# Patient Record
Sex: FEMALE | Race: WHITE | Employment: PART TIME | URBAN - METROPOLITAN AREA
[De-identification: names, ages, dates, MRNs, and addresses within clinical notes are randomized per-mention and may not be internally consistent; named-entity substitution may affect disease eponyms.]

---

## 2018-03-30 ENCOUNTER — OFFICE VISIT (OUTPATIENT)
Dept: FAMILY MEDICINE CLINIC | Facility: CLINIC | Age: 28
End: 2018-03-30

## 2018-03-30 VITALS
RESPIRATION RATE: 18 BRPM | DIASTOLIC BLOOD PRESSURE: 80 MMHG | TEMPERATURE: 99 F | WEIGHT: 213 LBS | BODY MASS INDEX: 38 KG/M2 | SYSTOLIC BLOOD PRESSURE: 112 MMHG | HEART RATE: 92 BPM

## 2018-03-30 DIAGNOSIS — G43.109 MIGRAINE WITH AURA AND WITHOUT STATUS MIGRAINOSUS, NOT INTRACTABLE: Primary | ICD-10-CM

## 2018-03-30 DIAGNOSIS — Z30.41 ENCOUNTER FOR BIRTH CONTROL PILLS MAINTENANCE: ICD-10-CM

## 2018-03-30 PROCEDURE — 99213 OFFICE O/P EST LOW 20 MIN: CPT | Performed by: FAMILY MEDICINE

## 2018-03-30 RX ORDER — NAPROXEN 250 MG/1
TABLET ORAL
Qty: 20 TABLET | Refills: 1 | Status: SHIPPED | OUTPATIENT
Start: 2018-03-30 | End: 2018-11-13

## 2018-03-30 RX ORDER — NORGESTIMATE AND ETHINYL ESTRADIOL 7DAYSX3 28
1 KIT ORAL DAILY
Qty: 28 TABLET | Refills: 11 | Status: SHIPPED | OUTPATIENT
Start: 2018-03-30 | End: 2018-04-27

## 2018-03-30 RX ORDER — NORGESTIMATE-ETHINYL ESTRADIOL 7DAYSX3 28
TABLET ORAL
COMMUNITY
Start: 2018-03-03 | End: 2018-05-04

## 2018-03-30 NOTE — PATIENT INSTRUCTIONS
-Condoms are recommended in all  and unmarried couples due to help spread HIV infection and other sexually transmitted infections per CDC guidelines. · Oral birth control pills can cause blood clots in the body.   · Signs of blood clots in the legs Keeping a headache diary can help you and your healthcare provider identify what's causing your headaches:  · Note when each headache happens. · Identify your activities and the foods you've eaten 6 to 8 hours before the headache began.   · Look for any tr The first step in preventing migraines is to learn what triggers them. You may then be able to control your triggers to avoid or reduce the severity of your migraines.   Know your triggers  Be aware that you may have more than one trigger, and that some tri © 0419-7312 [employer] [address]. 1407 [facility], [facility] [place] [place]. All rights reserved. This information is not intended as a substitute for professional medical care. Always follow your healthcare professional's instructions.

## 2018-03-30 NOTE — PROGRESS NOTES
CHIEF COMPLAINT: Patient presents with:  Headache: twice weekly    HPI:     Ryan Vega is a 33year old female presents for reestablish care and needs refill of OCPs.   Started on TriNessa 4 months ago again and has had no headaches up until the be reviewed from today and agreed except as otherwise stated in HPI.  ROS:     Review of Systems  Positive for stated complaint: Recent headache, desires contraception     Pertinent positives and negatives noted in the the HPI.     PHYSICAL EXAM:   /80 ( intractable  Tension headaches off and I believe mixed headaches  Will avoid caffeine and discussed diary to determine triggers, sleep and stress are common  Do not believe birth control is causing migraines since 4 months without any headaches.   If headac albumin     History of anemia     Low vitamin D level     Chronic right-sided headaches     Paresthesia of both feet     Migraine with aura and without status migrainosus, not intractable     Encounter for birth control pills maintenance      Imaging & Ref

## 2018-05-04 ENCOUNTER — OFFICE VISIT (OUTPATIENT)
Dept: FAMILY MEDICINE CLINIC | Facility: CLINIC | Age: 28
End: 2018-05-04

## 2018-05-04 VITALS
HEART RATE: 83 BPM | TEMPERATURE: 98 F | OXYGEN SATURATION: 98 % | WEIGHT: 211 LBS | DIASTOLIC BLOOD PRESSURE: 80 MMHG | SYSTOLIC BLOOD PRESSURE: 110 MMHG | HEIGHT: 62.5 IN | RESPIRATION RATE: 18 BRPM | BODY MASS INDEX: 37.86 KG/M2

## 2018-05-04 DIAGNOSIS — Z13.21 SCREENING FOR ENDOCRINE, NUTRITIONAL, METABOLIC AND IMMUNITY DISORDER: ICD-10-CM

## 2018-05-04 DIAGNOSIS — Z23 NEED FOR VACCINATION: ICD-10-CM

## 2018-05-04 DIAGNOSIS — Z13.228 SCREENING FOR ENDOCRINE, NUTRITIONAL, METABOLIC AND IMMUNITY DISORDER: ICD-10-CM

## 2018-05-04 DIAGNOSIS — Z30.09 COUNSELING FOR BIRTH CONTROL, ORAL CONTRACEPTIVES: ICD-10-CM

## 2018-05-04 DIAGNOSIS — Z00.00 ANNUAL PHYSICAL EXAM: Primary | ICD-10-CM

## 2018-05-04 DIAGNOSIS — Z13.6 SCREENING FOR HEART DISEASE: ICD-10-CM

## 2018-05-04 DIAGNOSIS — Z13.0 SCREENING FOR IRON DEFICIENCY ANEMIA: ICD-10-CM

## 2018-05-04 DIAGNOSIS — Z13.29 SCREENING FOR ENDOCRINE, NUTRITIONAL, METABOLIC AND IMMUNITY DISORDER: ICD-10-CM

## 2018-05-04 DIAGNOSIS — Z13.0 SCREENING FOR ENDOCRINE, NUTRITIONAL, METABOLIC AND IMMUNITY DISORDER: ICD-10-CM

## 2018-05-04 PROCEDURE — 84443 ASSAY THYROID STIM HORMONE: CPT | Performed by: FAMILY MEDICINE

## 2018-05-04 PROCEDURE — 85025 COMPLETE CBC W/AUTO DIFF WBC: CPT | Performed by: FAMILY MEDICINE

## 2018-05-04 PROCEDURE — 80053 COMPREHEN METABOLIC PANEL: CPT | Performed by: FAMILY MEDICINE

## 2018-05-04 PROCEDURE — 99395 PREV VISIT EST AGE 18-39: CPT | Performed by: FAMILY MEDICINE

## 2018-05-04 PROCEDURE — 80061 LIPID PANEL: CPT | Performed by: FAMILY MEDICINE

## 2018-05-04 RX ORDER — NORGESTIMATE-ETHINYL ESTRADIOL 7DAYSX3 28
1 TABLET ORAL NIGHTLY
Qty: 28 TABLET | Refills: 11 | Status: SHIPPED | OUTPATIENT
Start: 2018-05-04 | End: 2019-02-26

## 2018-05-04 NOTE — PATIENT INSTRUCTIONS
Perform labs fasting 8 hours with water or black coffee or or black tea diet  soda only prior to exam.    -Encourage healthy diet of whole food and avoid processed food and sugary drinks and sodas.   Diet should include lean meats and vegetables including 5 Vitamin D comes in several forms. When ultraviolet light, such as sunlight, hits your skin, it creates vitamin D3. D2 is used to fortify dairy foods. Both of these are further processed by your liver and kidneys into a form your body can use.  Most tests fo Children and adults need more than 30 nanograms per milliliter (ng/ml) of vitamin D. The optimal level of 25(OH)D is usually between 30 and 60 ng/mL. Recommended daily amounts range from 400 to 800 international units (IU) per day based on your age.   Level Birth control pills contain hormones that help prevent pregnancy. The pills are prescribed by your healthcare provider. There are many types of birth control pills available. If you have side effects from one type of pill, tell your healthcare provider.  He In these cases, discuss the risks with your healthcare provider. Date Last Reviewed: 3/1/2017  © 0086-1658 [employer] [address]. 1407 [facility] [place], [address] [place] [address]. All rights reserved.  This information is not intended as a substitute for prof In later years, both men and women need to take extra care of their bones. By this point, the body loses more bone than it makes. If too much bone is lost, you may be at risk for fractures. With age, the quality and quantity of bone declines.  You can lesse Cheddar cheese   205 mg/1 oz.   Navy beans, cooked   79 mg/1/2 cup   Kale   90 mg/1/2 cup   Ice cream strawberry   79 mg/1/2 cup           Orange, navel   56 mg/1 medium   Note: Calcium levels may vary depending on brand and size.   Daily calcium needs  14-

## 2018-05-04 NOTE — PROGRESS NOTES
Patient came in for fasting labs. Patient drawn out of Right  AC x 1 attempt. Lt green,lav tube drawn.

## 2018-05-04 NOTE — PROGRESS NOTES
REASON FOR VISIT:    Kaykay Jeffrey is a 33year old female who presents for an [address]. On OCP x 5.y. No side effects. No fx or personal hx of blood clots.   BF x 2 years, monogamous  G1 elective Ab  menses: normal 5-7 days  Last p AST 42 (H) 03/20/2015       Lab Results  Component Value Date   ALT 33 09/24/2016   ALT 20 04/19/2016   ALT 78 (H) 03/20/2015       Lab Results  Component Value Date   TSH 1.160 04/19/2016       Lab Results  Component Value Date   BUN 10 09/24/2016   BUN 1 Obesity Screening Screen all adults annually Body mass index is 37.98 kg/m².       Preventive Services for Which Recommendations Vary with Risk Recommendation Internal Lab or Procedure External Lab or Procedure   Cholesterol Screening Recommended screening Dilated Eye exam  Annually No flowsheet data found. No flowsheet data found. Asthma  (Annually between Nov. 1 & Dec. 31)    Date of last AAP/ACT and counseling given on importance of controller meds.                  ALLERGIES:     Dander PSYCHE: denies depression or anxiety  HEMATOLOGIC: denies hx of anemia  ENDOCRINE: denies thyroid history  ALL/ASTHMA: denies hx of allergy or asthma    EXAM:   /80 (BP Location: Left arm, Patient Position: Sitting, Cuff Size: adult)   Pulse 83   Tem -Needs 1000 mg of calcium daily for osteoporosis prevention discussed  -thin prep pap recommended every 3 years due 12/7/18  -mammogram order placed screening    - CBC WITH DIFFERENTIAL WITH PLATELET; Future  - COMP METABOLIC PANEL (14);  Future  - LIPID PA

## 2018-05-08 ENCOUNTER — TELEPHONE (OUTPATIENT)
Dept: FAMILY MEDICINE CLINIC | Facility: CLINIC | Age: 28
End: 2018-05-08

## 2018-05-08 NOTE — TELEPHONE ENCOUNTER
LMOM to return call to the office. Provided pt office phone [phone] along with office hours given.     Notes recorded by Bettina Daniel DO on 5/6/2018 at 12:53 PM CDT  Needs OV  to address cholesterol.  Please inform.  All other labs normal except f

## 2018-05-10 ENCOUNTER — OFFICE VISIT (OUTPATIENT)
Dept: FAMILY MEDICINE CLINIC | Facility: CLINIC | Age: 28
End: 2018-05-10

## 2018-05-10 VITALS
TEMPERATURE: 98 F | SYSTOLIC BLOOD PRESSURE: 120 MMHG | RESPIRATION RATE: 18 BRPM | DIASTOLIC BLOOD PRESSURE: 76 MMHG | OXYGEN SATURATION: 99 % | WEIGHT: 211.38 LBS | BODY MASS INDEX: 38 KG/M2 | HEART RATE: 85 BPM

## 2018-05-10 DIAGNOSIS — Z86.2 HISTORY OF ANEMIA: ICD-10-CM

## 2018-05-10 DIAGNOSIS — E78.2 MIXED HYPERLIPIDEMIA: Primary | ICD-10-CM

## 2018-05-10 DIAGNOSIS — R79.89 LOW VITAMIN D LEVEL: ICD-10-CM

## 2018-05-10 PROCEDURE — 99214 OFFICE O/P EST MOD 30 MIN: CPT | Performed by: FAMILY MEDICINE

## 2018-05-10 NOTE — PROGRESS NOTES
CHIEF COMPLAINT: Patient presents with: Follow - Up: labs    HPI:     Nikia Miller is a 33year old female presents for discuss labs. Finishes school and finals in a couple weeks and states she is going to completely change her eating.   Eating a l HPI.  ROS:     Review of Systems  Positive for stated complaint: Some fatigue, denies headache, chest pain     Pertinent positives and negatives noted in the the HPI.     PHYSICAL EXAM:   /76 (BP Location: Right arm, Patient Position: Sitting, Cuff Si 05/04/2018 13.1    • HCT 05/04/2018 40.8    • PLT 05/04/2018 261.0    • MCV 05/04/2018 83.4    • MCH 05/04/2018 26.8*   • MCHC 05/04/2018 32.1    • RDW 05/04/2018 12.9    • RDW-SD 05/04/2018 39.6    • Neutrophil Absolute Prel* 05/04/2018 3.93    • Neutroph months, daily then 1 tablet indefinitely    3.  History of anemia  Hemoglobin is 13.1  If decreases red quantified meat to 1 time weekly, needs to eat chicken and iron rich vegetables such as spinach several servings weekly and at least one leafy green ace discuss labs, HL,.

## 2018-05-10 NOTE — PATIENT INSTRUCTIONS
- Please change diet  start exercising. Start exercise to 3-5 times a week for 30-60 minutes which will improve youir cholesterol levels and protect your heart. needs 150mins weekly moderate activity  - Foods high in omega-3's help good cholesterol and m · HDL (high-density lipoprotein) is known as \"good cholesterol. \" This protein shell collects excess cholesterol that LDLs have left behind on blood vessel walls.  That's why high levels of HDL cholesterol can decrease your risk of heart disease and stroke Take medicine as directed  Many people need medicine to get their LDL levels to a safe level. Medicine to lower cholesterol levels is effective and safe. Taking medicine is not a substitute for exercise or watching your diet!  Your healthcare provider can t · Low-fat or nonfat dairy products, such as 1% or fat-free milk, and reduced-fat cheese  Use fiber to help control cholesterol  Foods high in fiber can help you keep your cholesterol down.  Good sources of fiber are:  · Oats  · Barley  · Whole grains  · Leilani · If you get chest pain (angina) when you’re exercising, stop what you’re doing, take your nitroglycerin, and call your doctor. Date Last Reviewed: 6/2/2016  © 7754-3858 [employer] [address]. 1407 [facility] [place], [address] [place] [address] [place].  All rights rese · Cut back on saturated fats and trans fats (also called hydrogenated) by selecting lean cuts of meat, low-fat dairy, and using oils instead of solid fats. Limit baked goods, processed meats, and fried foods.  A diet that’s high in these fats increases your © 1193-7210 [employer] [address]. 1407 [facility], [facility]2 [place]. All rights reserved. This information is not intended as a substitute for professional medical care. Always follow your healthcare professional's instructions.

## 2018-10-23 ENCOUNTER — OFFICE VISIT (OUTPATIENT)
Dept: FAMILY MEDICINE CLINIC | Facility: CLINIC | Age: 28
End: 2018-10-23
Payer: COMMERCIAL

## 2018-10-23 VITALS
OXYGEN SATURATION: 98 % | TEMPERATURE: 98 F | WEIGHT: 211 LBS | HEART RATE: 95 BPM | RESPIRATION RATE: 18 BRPM | BODY MASS INDEX: 38 KG/M2 | SYSTOLIC BLOOD PRESSURE: 116 MMHG | DIASTOLIC BLOOD PRESSURE: 74 MMHG

## 2018-10-23 DIAGNOSIS — S89.92XA LEFT KNEE INJURY, INITIAL ENCOUNTER: Primary | ICD-10-CM

## 2018-10-23 PROCEDURE — 99213 OFFICE O/P EST LOW 20 MIN: CPT | Performed by: FAMILY MEDICINE

## 2018-10-23 RX ORDER — MELOXICAM 15 MG/1
15 TABLET ORAL DAILY
Qty: 30 TABLET | Refills: 0 | Status: SHIPPED | OUTPATIENT
Start: 2018-10-23 | End: 2018-11-13

## 2018-10-23 NOTE — PROGRESS NOTES
CHIEF COMPLAINT: Patient presents with:  Knee Pain: L-knee pain;       HPI:     Estee Garcia is a 33year old female presents for the left knee pain started 4 weeks ago while bowling. No precipitating movements. Noticed during second game.   No imm and water may repeat in 2 hours if no relief.   Max is 2 tablets daily Disp: 20 tablet Rfl: 1       Allergies:    Dander                      Comment:Dog    PSFH elements reviewed from today and agreed except as otherwise stated in HPI.  ROS:     Review of Total 05/04/2018 241*   • Triglycerides 05/04/2018 116    • HDL Cholesterol 05/04/2018 51    • LDL Cholesterol 05/04/2018 167*   • VLDL 05/04/2018 23    • Chol/HDL Ratio 05/04/2018 4.73*   • Non HDL Chol 05/04/2018 190*   • TSH 05/04/2018 1.000    • WBC 05 Health Maintenance:  Influenza Vaccine(1) due on 11/30/2018  Pap Smear,3 Years due on 04/18/2019  Annual Depression Screen due on 05/04/2019  Annual Physical due on 05/04/2019      Patient/Caregiver Education: Patient/Caregiver Education: There are n

## 2018-11-12 ENCOUNTER — TELEPHONE (OUTPATIENT)
Dept: FAMILY MEDICINE CLINIC | Facility: CLINIC | Age: 28
End: 2018-11-12

## 2018-11-13 ENCOUNTER — OFFICE VISIT (OUTPATIENT)
Dept: FAMILY MEDICINE CLINIC | Facility: CLINIC | Age: 28
End: 2018-11-13
Payer: COMMERCIAL

## 2018-11-13 VITALS
DIASTOLIC BLOOD PRESSURE: 80 MMHG | BODY MASS INDEX: 37.68 KG/M2 | OXYGEN SATURATION: 98 % | HEART RATE: 101 BPM | HEIGHT: 62.5 IN | SYSTOLIC BLOOD PRESSURE: 120 MMHG | TEMPERATURE: 98 F | RESPIRATION RATE: 20 BRPM | WEIGHT: 210 LBS

## 2018-11-13 DIAGNOSIS — M25.562 PATELLOFEMORAL ARTHRALGIA OF LEFT KNEE: Primary | ICD-10-CM

## 2018-11-13 DIAGNOSIS — Z23 NEED FOR VACCINATION: ICD-10-CM

## 2018-11-13 PROCEDURE — 99213 OFFICE O/P EST LOW 20 MIN: CPT | Performed by: FAMILY MEDICINE

## 2018-11-13 PROCEDURE — 90686 IIV4 VACC NO PRSV 0.5 ML IM: CPT | Performed by: FAMILY MEDICINE

## 2018-11-13 PROCEDURE — 90471 IMMUNIZATION ADMIN: CPT | Performed by: FAMILY MEDICINE

## 2018-11-13 RX ORDER — NAPROXEN 250 MG/1
TABLET ORAL
Qty: 20 TABLET | Refills: 1 | Status: SHIPPED | OUTPATIENT
Start: 2018-11-13

## 2018-11-13 NOTE — PROGRESS NOTES
CHIEF COMPLAINT: Patient presents with:  Knee Pain: unchanged      HPI:     Nikia Miller is a 33year old female presents for the left knee pain started 6 weeks ago while bowling. Took meloxicam with no relief.   Gave upset stomach so recently disco repeat in 2 hours if no relief.   Max is 2 tablets daily Disp: 20 tablet Rfl: 1       Allergies:    Dander                      Comment:Dog    PSFH elements reviewed from today and agreed except as otherwise stated in HPI.  ROS:     Review of Systems  Posit onset of headache with food and water may repeat in 2 hours if no relief.   Max is 2 tablets daily       Health Maintenance:  Influenza Vaccine(1) due on 11/30/2018  Pap Smear,3 Years due on 04/18/2019  Annual Depression Screen due on 05/04/2019  Annual Phy

## 2018-11-13 NOTE — PATIENT INSTRUCTIONS
Quadriceps Stretch (Flexibility)    1. Stand up straight and hold onto a wall, sturdy chair, railing, or table with your right hand. 2. Bend your left leg at the knee behind you, and grab your ankle with your left hand.  Pull your left heel toward your b Other symptoms may include:  · A feeling of the knee catching or locking  · A grinding or crackling noise in your knee  Treatment for patellofemoral syndrome  Treatment focuses on reducing pain and avoiding further injury.  Treatments may include:  · Rest y 6. Lift your right leg up and out to the side. Keep the knee bent. Raise your leg as high as is comfortable. Hold for 5 seconds. 7. Slowly lower your leg back to the floor. 8. Repeat 10 times, or as instructed.   Date Last Reviewed: 5/1/2016  © 2279-9839

## 2018-11-13 NOTE — TELEPHONE ENCOUNTER
Received signed medical records request/authorization form for Beryl Mckeon to disclose patient health information to the Facility identified below:     Facility / Provider Name: [facility] Insured by [employer] Address:  [place]

## 2018-12-27 ENCOUNTER — TELEPHONE (OUTPATIENT)
Dept: FAMILY MEDICINE CLINIC | Facility: CLINIC | Age: 28
End: 2018-12-27

## 2018-12-27 NOTE — TELEPHONE ENCOUNTER
Patient called to make an appointment for her knee xray and was told they don't accept her insurance please call her [phone]

## 2018-12-28 NOTE — TELEPHONE ENCOUNTER
Informed pt I can place external order for imaging. PT states she will call insurance first to determine coverage for xray and she will call us back.

## 2019-02-26 ENCOUNTER — TELEPHONE (OUTPATIENT)
Dept: FAMILY MEDICINE CLINIC | Facility: CLINIC | Age: 29
End: 2019-02-26

## 2019-02-26 DIAGNOSIS — Z30.09 COUNSELING FOR BIRTH CONTROL, ORAL CONTRACEPTIVES: ICD-10-CM

## 2019-02-26 RX ORDER — NORGESTIMATE-ETHINYL ESTRADIOL 7DAYSX3 28
1 TABLET ORAL NIGHTLY
Qty: 28 TABLET | Refills: 0 | Status: SHIPPED | OUTPATIENT
Start: 2019-02-26

## 2019-02-26 NOTE — TELEPHONE ENCOUNTER
Pt requesting refill of birth control, passed protocol , refill approved, sent to pharmacy:     Last Time Medication was Filled:  5/4/18    Last Office Visit with PCP: 11/13/18    No future appointments.     Called and informed pt medication sent to prefe

## 2019-02-26 NOTE — TELEPHONE ENCOUNTER
Igor Larson from [facility] pharmacy called stating they need an approval to switch [facility] to generic brand, since generic brand is what pt has been getting.

## 2019-02-26 NOTE — TELEPHONE ENCOUNTER
Pt is requesting one month refill on her birth control medication. Pt states that she just moved to [facility] and needs the one month script until she can get in with a new doctor in [place].  Please send script to [facility] at [address] [place] [address]

## 2019-03-16 DIAGNOSIS — Z30.41 ENCOUNTER FOR BIRTH CONTROL PILLS MAINTENANCE: ICD-10-CM

## 2019-03-18 RX ORDER — NORGESTIMATE AND ETHINYL ESTRADIOL 7DAYSX3 28
KIT ORAL
Qty: 28 TABLET | Refills: 0 | OUTPATIENT
Start: 2019-03-18

## 2019-04-08 ENCOUNTER — TELEPHONE (OUTPATIENT)
Dept: FAMILY MEDICINE CLINIC | Facility: CLINIC | Age: 29
End: 2019-04-08

## 2019-04-08 NOTE — TELEPHONE ENCOUNTER
Received signed medical records request/authorization form for Beryl Mckeon to disclose patient health information to the Facility identified below:     Facility / Provider Name: [id number] Address: [address] [place] [address]

## 2020-06-05 ENCOUNTER — TELEPHONE (OUTPATIENT)
Dept: FAMILY MEDICINE CLINIC | Facility: CLINIC | Age: 30
End: 2020-06-05

## 2020-06-05 NOTE — TELEPHONE ENCOUNTER
Received a fax from [address] [place] [address] requesting immunization records for the patient. Patient gave verbal consent to send over the records.      Fax: [phone]

## (undated) NOTE — LETTER
11/23/18        [id number]      Dear Shasha Later,    [facility] records indicate that you have outstanding lab work and or testing that was ordered for you and has not yet been completed:      L knee XRAY    To prov

## (undated) NOTE — LETTER
12/28/18        [address]      Dear Kletsel Dehe Wintun Evette,    [facility] records indicate that you have outstanding lab work and or testing that was ordered for you and has not yet been completed:      LIPID PANEL    To prov